# Patient Record
Sex: FEMALE | Race: BLACK OR AFRICAN AMERICAN | URBAN - METROPOLITAN AREA
[De-identification: names, ages, dates, MRNs, and addresses within clinical notes are randomized per-mention and may not be internally consistent; named-entity substitution may affect disease eponyms.]

---

## 2020-09-23 ENCOUNTER — OFFICE VISIT (OUTPATIENT)
Dept: URBAN - METROPOLITAN AREA CLINIC 82 | Facility: CLINIC | Age: 75
End: 2020-09-23
Payer: COMMERCIAL

## 2020-09-23 DIAGNOSIS — K59.00 CONSTIPATION: ICD-10-CM

## 2020-09-23 DIAGNOSIS — R15.9 FECAL INCONTINENCE: ICD-10-CM

## 2020-09-23 DIAGNOSIS — R10.13 DYSPEPSIA: ICD-10-CM

## 2020-09-23 PROCEDURE — G8427 DOCREV CUR MEDS BY ELIG CLIN: HCPCS | Performed by: INTERNAL MEDICINE

## 2020-09-23 PROCEDURE — 3017F COLORECTAL CA SCREEN DOC REV: CPT | Performed by: INTERNAL MEDICINE

## 2020-09-23 PROCEDURE — 99214 OFFICE O/P EST MOD 30 MIN: CPT | Performed by: INTERNAL MEDICINE

## 2020-09-23 PROCEDURE — 1036F TOBACCO NON-USER: CPT | Performed by: INTERNAL MEDICINE

## 2020-09-23 PROCEDURE — G8420 CALC BMI NORM PARAMETERS: HCPCS | Performed by: INTERNAL MEDICINE

## 2020-09-23 RX ORDER — PLECANATIDE 3 MG/1
1 TABLET TABLET ORAL ONCE A DAY
Qty: 90 | Refills: 1 | OUTPATIENT
Start: 2020-09-23 | End: 2021-03-22

## 2020-09-23 RX ORDER — VERAPAMIL HYDROCHLORIDE 240 MG/1
CAPSULE, DELAYED RELEASE PELLETS ORAL
Qty: 0 | Refills: 0 | Status: ACTIVE | COMMUNITY
Start: 1900-01-01

## 2020-09-23 RX ORDER — LEVOTHYROXINE SODIUM 25 UG/1
TABLET ORAL
Qty: 0 | Refills: 0 | Status: ACTIVE | COMMUNITY
Start: 1900-01-01

## 2020-09-23 RX ORDER — DICYCLOMINE HYDROCHLORIDE 10 MG/1
1 TABLET CAPSULE ORAL THREE TIMES A DAY
Qty: 90 | Refills: 1 | OUTPATIENT
Start: 2020-09-23 | End: 2020-11-21

## 2020-09-23 RX ORDER — SOLIFENACIN SUCCINATE 5 MG/1
TABLET, FILM COATED ORAL
Qty: 0 | Refills: 0 | Status: ACTIVE | COMMUNITY
Start: 1900-01-01

## 2020-09-23 RX ORDER — EFINACONAZOLE 100 MG/ML
SOLUTION TOPICAL
Qty: 0 | Refills: 0 | Status: ACTIVE | COMMUNITY
Start: 1900-01-01

## 2020-09-23 RX ORDER — PANTOPRAZOLE SODIUM 40 MG/1
1 TABLET TABLET, DELAYED RELEASE ORAL ONCE A DAY
Qty: 90 | Refills: 1 | OUTPATIENT
Start: 2020-09-23

## 2020-09-23 NOTE — HPI-OTHER HISTORIES
Patient's previous office visit notes: The patient is a 74 year old /Black female, who presents on referral from Ferny Villalobos MD, for a colonoscopy. A copy of this document will be sent to the referring provider. The patient had a previous colonoscopy on 3/10/2016. It revealed diverticulosis and anal fissure in the sigmoid colon. The patient does not have any risk factors for colon cancer, but is over the recommended age for screening.  There is no recent history of rectal bleeding. The patient has no pertinent additional complaints of abdominal pain, constipation, diarrhea, and weight loss.     9/18/2019 Patient denies any symptoms. She states she still has fecal incontinence after she eats. She does notice fecal incontinence more when she consumes milk/dairy products. She occasionally takes Metamucil. She states she has not had any hemorrhoidal surgeries before. She does admit to a history of anemia. She states she has occasional constipation when she doesn't eat fruits and vegetables. She had her last labs with her PCP. She was found to have high glucose. Denies any NSAIDs use. Denies any melena or hematochezia.  12/27/2019 Colonoscopy done on 10/14/2019 - showed hemorrhoids, otherwise normal colonoscopy. preparation was adequate, stool noted in the sigmoid colon, splenic flexure and hepatic flexure. Patient denies any issues since her last visit. Patient still reports occasional constipation, improved. She states she eats vegetables as a source of fiber and also takes Citrucel. Denies any heartburn or acid reflux. Denies any melena or hematochezia.

## 2020-09-23 NOTE — HPI-TODAY'S VISIT:
09/23/2020 Patient presents for a follow up office visit after previous visit on 12/27/2019. Colonoscopy done on 10/11/2019 showed internal hemorrhoids, stool in the colon and poor prep. Patient complains of recent episode of fecal incontinence in August. She had abdominal pain at the time. She was also previously constipated and was on Miralax. She states she has fecal incontinence daily. She was negative for COVID-19. She reports consuming fiber rich foods. She has occasional heartburn and acid reflux, but is not on any medications. She states that in 2006, her doctor noticed she had thyroid problem. She is currently on Levothyroxine.

## 2020-10-19 ENCOUNTER — TELEPHONE ENCOUNTER (OUTPATIENT)
Dept: URBAN - METROPOLITAN AREA CLINIC 82 | Facility: CLINIC | Age: 75
End: 2020-10-19

## 2020-10-19 RX ORDER — DICYCLOMINE HYDROCHLORIDE 10 MG/1
1 TABLET CAPSULE ORAL THREE TIMES A DAY
Qty: 90 | Refills: 1
Start: 2020-09-23

## 2020-10-28 ENCOUNTER — OFFICE VISIT (OUTPATIENT)
Dept: URBAN - METROPOLITAN AREA CLINIC 82 | Facility: CLINIC | Age: 75
End: 2020-10-28

## 2020-11-24 ENCOUNTER — ERX REFILL RESPONSE (OUTPATIENT)
Dept: URBAN - METROPOLITAN AREA CLINIC 82 | Facility: CLINIC | Age: 75
End: 2020-11-24

## 2020-11-24 RX ORDER — DICYCLOMINE HYDROCHLORIDE 10 MG/1
1 TABLET CAPSULE ORAL THREE TIMES A DAY
Qty: 90 | Refills: 1 | OUTPATIENT

## 2020-11-24 RX ORDER — DICYCLOMINE HYDROCHLORIDE 10 MG/1
TAKE 1 CAPSULE BY MOUTH THREE TIMES A DAY CAPSULE ORAL
Qty: 270 CAPSULE | Refills: 1 | OUTPATIENT

## 2020-11-25 ENCOUNTER — OFFICE VISIT (OUTPATIENT)
Dept: URBAN - METROPOLITAN AREA CLINIC 82 | Facility: CLINIC | Age: 75
End: 2020-11-25
Payer: COMMERCIAL

## 2020-11-25 DIAGNOSIS — R15.9 FECAL INCONTINENCE: ICD-10-CM

## 2020-11-25 DIAGNOSIS — R10.13 DYSPEPSIA: ICD-10-CM

## 2020-11-25 DIAGNOSIS — K59.00 CONSTIPATION: ICD-10-CM

## 2020-11-25 PROCEDURE — G8482 FLU IMMUNIZE ORDER/ADMIN: HCPCS | Performed by: INTERNAL MEDICINE

## 2020-11-25 PROCEDURE — 3017F COLORECTAL CA SCREEN DOC REV: CPT | Performed by: INTERNAL MEDICINE

## 2020-11-25 PROCEDURE — G9903 PT SCRN TBCO ID AS NON USER: HCPCS | Performed by: INTERNAL MEDICINE

## 2020-11-25 PROCEDURE — 99214 OFFICE O/P EST MOD 30 MIN: CPT | Performed by: INTERNAL MEDICINE

## 2020-11-25 PROCEDURE — G8420 CALC BMI NORM PARAMETERS: HCPCS | Performed by: INTERNAL MEDICINE

## 2020-11-25 PROCEDURE — G8427 DOCREV CUR MEDS BY ELIG CLIN: HCPCS | Performed by: INTERNAL MEDICINE

## 2020-11-25 RX ORDER — EFINACONAZOLE 100 MG/ML
SOLUTION TOPICAL
Qty: 0 | Refills: 0 | Status: ACTIVE | COMMUNITY
Start: 1900-01-01

## 2020-11-25 RX ORDER — MOXIFLOXACIN HYDROCHLORIDE 400 MG/1
1 TABLET TABLET, FILM COATED ORAL ONCE A DAY
Qty: 10 TABLET | OUTPATIENT
Start: 2020-11-25 | End: 2020-12-05

## 2020-11-25 RX ORDER — VERAPAMIL HYDROCHLORIDE 240 MG/1
CAPSULE, DELAYED RELEASE PELLETS ORAL
Qty: 0 | Refills: 0 | Status: ACTIVE | COMMUNITY
Start: 1900-01-01

## 2020-11-25 RX ORDER — PANTOPRAZOLE SODIUM 40 MG/1
1 TABLET TABLET, DELAYED RELEASE ORAL ONCE A DAY
Qty: 90 | Refills: 1 | Status: ACTIVE | COMMUNITY
Start: 2020-09-23

## 2020-11-25 RX ORDER — DICYCLOMINE HYDROCHLORIDE 10 MG/1
TAKE 1 CAPSULE BY MOUTH THREE TIMES A DAY CAPSULE ORAL
Qty: 270 CAPSULE | Refills: 1 | Status: ACTIVE | COMMUNITY

## 2020-11-25 RX ORDER — LEVOTHYROXINE SODIUM 25 UG/1
TABLET ORAL
Qty: 0 | Refills: 0 | Status: ACTIVE | COMMUNITY
Start: 1900-01-01

## 2020-11-25 RX ORDER — SODIUM, POTASSIUM,MAG SULFATES 17.5-3.13G
354 ML SOLUTION, RECONSTITUTED, ORAL ORAL
Qty: 354 ML | Refills: 0 | OUTPATIENT
Start: 2020-11-25 | End: 2020-11-26

## 2020-11-25 RX ORDER — PLECANATIDE 3 MG/1
1 TABLET TABLET ORAL ONCE A DAY
Qty: 90 | Refills: 1 | Status: ACTIVE | COMMUNITY
Start: 2020-09-23 | End: 2021-03-22

## 2020-11-25 RX ORDER — SOLIFENACIN SUCCINATE 5 MG/1
TABLET, FILM COATED ORAL
Qty: 0 | Refills: 0 | Status: ACTIVE | COMMUNITY
Start: 1900-01-01

## 2020-11-25 NOTE — HPI-TODAY'S VISIT:
09/23/2020 Patient presents for a follow up office visit after previous visit on 12/27/2019. Colonoscopy done on 10/11/2019 showed internal hemorrhoids, stool in the colon and poor prep. Patient complains of recent episode of fecal incontinence in August. She had abdominal pain at the time. She was also previously constipated and was on Miralax. She states she has fecal incontinence daily. She was negative for COVID-19. She reports consuming fiber rich foods. She has occasional heartburn and acid reflux, but is not on any medications. She states that in 2006, her doctor noticed she had thyroid problem. She is currently on Levothyroxine.  11/25/2020  Patient presents for a follow up office visit. Patient states she is not doing well. She states Trulance has caused her to have diarrhea only. She is unable to tolerate the medication and has more fecal incontinence. She is unaware of the fecal incontinence when it does happen. She has abdominal pain only when she really needs to have a bowel movement. However, when she does get to the bathroom there is stool soilage on her underwear. Patient denies any stress more than the usual. She does not sleep well. 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 She did change her diet and tries not to eat too much rice and pasta. She discontinued milk and dairy.

## 2020-12-28 ENCOUNTER — OFFICE VISIT (OUTPATIENT)
Dept: URBAN - METROPOLITAN AREA SURGERY CENTER 13 | Facility: SURGERY CENTER | Age: 75
End: 2020-12-28
Payer: COMMERCIAL

## 2020-12-28 DIAGNOSIS — R93.3 ABN FINDINGS-GI TRACT: ICD-10-CM

## 2020-12-28 DIAGNOSIS — R19.4 ALTERED BOWEL HABITS: ICD-10-CM

## 2020-12-28 PROCEDURE — G8907 PT DOC NO EVENTS ON DISCHARG: HCPCS | Performed by: INTERNAL MEDICINE

## 2020-12-28 PROCEDURE — 45378 DIAGNOSTIC COLONOSCOPY: CPT | Performed by: INTERNAL MEDICINE

## 2020-12-28 PROCEDURE — G9937 DIG OR SURV COLSCO: HCPCS | Performed by: INTERNAL MEDICINE

## 2021-07-02 ENCOUNTER — TELEPHONE ENCOUNTER (OUTPATIENT)
Dept: URBAN - METROPOLITAN AREA CLINIC 82 | Facility: CLINIC | Age: 76
End: 2021-07-02

## 2021-07-13 ENCOUNTER — OFFICE VISIT (OUTPATIENT)
Dept: URBAN - METROPOLITAN AREA CLINIC 82 | Facility: CLINIC | Age: 76
End: 2021-07-13
Payer: COMMERCIAL

## 2021-07-13 VITALS
WEIGHT: 133.4 LBS | HEIGHT: 62 IN | BODY MASS INDEX: 24.55 KG/M2 | SYSTOLIC BLOOD PRESSURE: 133 MMHG | HEART RATE: 83 BPM | DIASTOLIC BLOOD PRESSURE: 69 MMHG | TEMPERATURE: 97.1 F

## 2021-07-13 DIAGNOSIS — R14.3 GAS AND BLOATING: ICD-10-CM

## 2021-07-13 DIAGNOSIS — R10.13 DYSPEPSIA: ICD-10-CM

## 2021-07-13 DIAGNOSIS — K59.1 FUNCTIONAL DIARRHEA: ICD-10-CM

## 2021-07-13 DIAGNOSIS — R15.9 FECAL INCONTINENCE: ICD-10-CM

## 2021-07-13 DIAGNOSIS — K59.00 CONSTIPATION: ICD-10-CM

## 2021-07-13 PROCEDURE — G8420 CALC BMI NORM PARAMETERS: HCPCS | Performed by: INTERNAL MEDICINE

## 2021-07-13 PROCEDURE — G9903 PT SCRN TBCO ID AS NON USER: HCPCS | Performed by: INTERNAL MEDICINE

## 2021-07-13 PROCEDURE — 99214 OFFICE O/P EST MOD 30 MIN: CPT | Performed by: INTERNAL MEDICINE

## 2021-07-13 PROCEDURE — G8427 DOCREV CUR MEDS BY ELIG CLIN: HCPCS | Performed by: INTERNAL MEDICINE

## 2021-07-13 RX ORDER — CHOLESTYRAMINE 4 G/9G
1 PACKET MIXED WITH WATER OR NON-CARBONATED DRINK POWDER, FOR SUSPENSION ORAL TWICE A DAY
Qty: 60 | Refills: 1 | OUTPATIENT
Start: 2021-07-13

## 2021-07-13 RX ORDER — SOLIFENACIN SUCCINATE 5 MG/1
TABLET, FILM COATED ORAL
Qty: 0 | Refills: 0 | Status: ACTIVE | COMMUNITY
Start: 1900-01-01

## 2021-07-13 RX ORDER — EFINACONAZOLE 100 MG/ML
SOLUTION TOPICAL
Qty: 0 | Refills: 0 | Status: ACTIVE | COMMUNITY
Start: 1900-01-01

## 2021-07-13 RX ORDER — PANTOPRAZOLE SODIUM 40 MG/1
1 TABLET TABLET, DELAYED RELEASE ORAL ONCE A DAY
Qty: 90 | Refills: 1 | Status: ACTIVE | COMMUNITY
Start: 2020-09-23

## 2021-07-13 RX ORDER — DICYCLOMINE HYDROCHLORIDE 10 MG/1
TAKE 1 CAPSULE BY MOUTH THREE TIMES A DAY CAPSULE ORAL
Qty: 270 CAPSULE | Refills: 1 | Status: ACTIVE | COMMUNITY

## 2021-07-13 RX ORDER — VERAPAMIL HYDROCHLORIDE 240 MG/1
CAPSULE, DELAYED RELEASE PELLETS ORAL
Qty: 0 | Refills: 0 | Status: ACTIVE | COMMUNITY
Start: 1900-01-01

## 2021-07-13 RX ORDER — LEVOTHYROXINE SODIUM 25 UG/1
TABLET ORAL
Qty: 0 | Refills: 0 | Status: ACTIVE | COMMUNITY
Start: 1900-01-01

## 2021-07-13 NOTE — HPI-TODAY'S VISIT:
09/23/2020 Patient presents for a follow up office visit after previous visit on 12/27/2019. Colonoscopy done on 10/11/2019 showed internal hemorrhoids, stool in the colon and poor prep. Patient complains of recent episode of fecal incontinence in August. She had abdominal pain at the time. She was also previously constipated and was on Miralax. She states she has fecal incontinence daily. She was negative for COVID-19. She reports consuming fiber rich foods. She has occasional heartburn and acid reflux, but is not on any medications. She states that in 2006, her doctor noticed she had thyroid problem. She is currently on Levothyroxine.  11/25/2020  Patient presents for a follow up office visit. Patient states she is not doing well. She states Trulance has caused her to have diarrhea only. She is unable to tolerate the medication and has more fecal incontinence. She is unaware of the fecal incontinence when it does happen. She has abdominal pain only when she really needs to have a bowel movement. However, when she does get to the bathroom there is stool soilage on her underwear. Patient denies any stress more than the usual. She does not sleep well. 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 She did change her diet and tries not to eat too much rice and pasta. She discontinued milk and dairy.  07/13/2021 Patient presents for an office visit for evaluation of bloating, dyspepsia, and diarrhea. Colonoscopy on 12/28/2020 was normal. She c/o gas and bloating with foul-smelling stools and diarrhea. She is currently taking probiotics. She reports dicyclomine worsened her symptoms. Patient would like to have a H pylori breath test done as she has a history of H pylori infection. She also reports sour smelling belching when she eats. Patient does admit experiencing more stress recently. Denies any abdominal pain with her symptoms. She takes Nexium as needed for acid reflux symptoms.

## 2021-07-22 ENCOUNTER — ERX REFILL RESPONSE (OUTPATIENT)
Dept: URBAN - METROPOLITAN AREA CLINIC 82 | Facility: CLINIC | Age: 76
End: 2021-07-22

## 2021-07-22 RX ORDER — CHOLESTYRAMINE 4 G/9G
USE 1 PACKET MIXED WITH WATER OR NON-CARBONATED DRINK BY MOUTH TWICE A DAY X 30 DAY(S) POWDER, FOR SUSPENSION ORAL
Qty: 180 PACKET | Refills: 1 | OUTPATIENT

## 2021-07-22 RX ORDER — CHOLESTYRAMINE 4 G/9G
1 PACKET MIXED WITH WATER OR NON-CARBONATED DRINK POWDER, FOR SUSPENSION ORAL TWICE A DAY
Qty: 60 | Refills: 1 | OUTPATIENT

## 2021-07-31 LAB — H PYLORI BREATH TEST: NEGATIVE

## 2022-10-14 ENCOUNTER — OFFICE VISIT (OUTPATIENT)
Dept: URBAN - METROPOLITAN AREA CLINIC 82 | Facility: CLINIC | Age: 77
End: 2022-10-14
Payer: COMMERCIAL

## 2022-10-14 ENCOUNTER — LAB OUTSIDE AN ENCOUNTER (OUTPATIENT)
Dept: URBAN - METROPOLITAN AREA CLINIC 82 | Facility: CLINIC | Age: 77
End: 2022-10-14

## 2022-10-14 VITALS
BODY MASS INDEX: 21.35 KG/M2 | DIASTOLIC BLOOD PRESSURE: 67 MMHG | HEIGHT: 62 IN | SYSTOLIC BLOOD PRESSURE: 115 MMHG | HEART RATE: 82 BPM | TEMPERATURE: 97.2 F | WEIGHT: 116 LBS | RESPIRATION RATE: 16 BRPM

## 2022-10-14 DIAGNOSIS — K59.00 CONSTIPATION: ICD-10-CM

## 2022-10-14 DIAGNOSIS — R14.3 GAS AND BLOATING: ICD-10-CM

## 2022-10-14 DIAGNOSIS — R15.9 FECAL INCONTINENCE: ICD-10-CM

## 2022-10-14 DIAGNOSIS — K59.1 FUNCTIONAL DIARRHEA: ICD-10-CM

## 2022-10-14 DIAGNOSIS — R93.89 ABNORMAL CT SCAN: ICD-10-CM

## 2022-10-14 DIAGNOSIS — R10.13 DYSPEPSIA: ICD-10-CM

## 2022-10-14 PROCEDURE — 99214 OFFICE O/P EST MOD 30 MIN: CPT | Performed by: INTERNAL MEDICINE

## 2022-10-14 PROCEDURE — G8427 DOCREV CUR MEDS BY ELIG CLIN: HCPCS | Performed by: INTERNAL MEDICINE

## 2022-10-14 PROCEDURE — G9903 PT SCRN TBCO ID AS NON USER: HCPCS | Performed by: INTERNAL MEDICINE

## 2022-10-14 PROCEDURE — G8418 CALC BMI BLW LOW PARAM F/U: HCPCS | Performed by: INTERNAL MEDICINE

## 2022-10-14 RX ORDER — SOLIFENACIN SUCCINATE 5 MG/1
TABLET, FILM COATED ORAL
Qty: 0 | Refills: 0 | Status: ACTIVE | COMMUNITY
Start: 1900-01-01

## 2022-10-14 RX ORDER — LINACLOTIDE 72 UG/1
1 CAPSULE AT LEAST 30 MINUTES BEFORE THE FIRST MEAL OF THE DAY ON AN EMPTY STOMACH CAPSULE, GELATIN COATED ORAL ONCE A DAY
Qty: 30 | Refills: 1 | OUTPATIENT
Start: 2022-10-14 | End: 2022-12-12

## 2022-10-14 RX ORDER — DICYCLOMINE HYDROCHLORIDE 10 MG/1
TAKE 1 CAPSULE BY MOUTH THREE TIMES A DAY CAPSULE ORAL
Qty: 270 CAPSULE | Refills: 1 | Status: ACTIVE | COMMUNITY

## 2022-10-14 RX ORDER — VERAPAMIL HYDROCHLORIDE 240 MG/1
CAPSULE, DELAYED RELEASE PELLETS ORAL
Qty: 0 | Refills: 0 | Status: ACTIVE | COMMUNITY
Start: 1900-01-01

## 2022-10-14 RX ORDER — PANTOPRAZOLE SODIUM 40 MG/1
1 TABLET TABLET, DELAYED RELEASE ORAL ONCE A DAY
Qty: 90 | Refills: 1 | Status: ACTIVE | COMMUNITY
Start: 2020-09-23

## 2022-10-14 RX ORDER — LEVOTHYROXINE SODIUM 25 UG/1
TABLET ORAL
Qty: 0 | Refills: 0 | Status: ACTIVE | COMMUNITY
Start: 1900-01-01

## 2022-10-14 RX ORDER — CHOLESTYRAMINE 4 G/9G
USE 1 PACKET MIXED WITH WATER OR NON-CARBONATED DRINK BY MOUTH TWICE A DAY X 30 DAY(S) POWDER, FOR SUSPENSION ORAL
Qty: 180 PACKET | Refills: 1 | Status: ACTIVE | COMMUNITY

## 2022-10-14 RX ORDER — EFINACONAZOLE 100 MG/ML
SOLUTION TOPICAL
Qty: 0 | Refills: 0 | Status: ACTIVE | COMMUNITY
Start: 1900-01-01

## 2022-10-14 NOTE — HPI-TODAY'S VISIT:
09/23/2020 Patient presents for a follow up office visit after previous visit on 12/27/2019. Colonoscopy done on 10/11/2019 showed internal hemorrhoids, stool in the colon and poor prep. Patient complains of recent episode of fecal incontinence in August. She had abdominal pain at the time. She was also previously constipated and was on Miralax. She states she has fecal incontinence daily. She was negative for COVID-19. She reports consuming fiber rich foods. She has occasional heartburn and acid reflux, but is not on any medications. She states that in 2006, her doctor noticed she had thyroid problem. She is currently on Levothyroxine.  11/25/2020  Patient presents for a follow up office visit. Patient states she is not doing well. She states Trulance has caused her to have diarrhea only. She is unable to tolerate the medication and has more fecal incontinence. She is unaware of the fecal incontinence when it does happen. She has abdominal pain only when she really needs to have a bowel movement. However, when she does get to the bathroom there is stool soilage on her underwear. Patient denies any stress more than the usual. She does not sleep well. 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 She did change her diet and tries not to eat too much rice and pasta. She discontinued milk and dairy.  07/13/2021 Patient presents for an office visit for evaluation of bloating, dyspepsia, and diarrhea. Colonoscopy on 12/28/2020 was normal. She c/o gas and bloating with foul-smelling stools and diarrhea. She is currently taking probiotics. She reports dicyclomine worsened her symptoms. Patient would like to have a H pylori breath test done as she has a history of H pylori infection. She also reports sour smelling belching when she eats. Patient does admit experiencing more stress recently. Denies any abdominal pain with her symptoms. She takes Nexium as needed for acid reflux symptoms.  10/14/2022 Patient presents for a follow up on [facility] ED visit. CT scan done on 10/10/2022 showed thickening of antrum and pylorus. Ultrasound shows thickening of the gall bladder. Patient states that she has been having a lot of flatulence and that she is not moving her bowels. She states that she tried the suppository and colace for constipation but that it has not been helping her. She denies vomiting. 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 She states that the pain has been having pain since September. She states that she also had carpal tunnel surgery and that she was also put on pain medication. She states that she was given ultram. Patient states that miralax did not help her any more as it caused her to have bad diarrhea.

## 2022-10-31 ENCOUNTER — CLAIMS CREATED FROM THE CLAIM WINDOW (OUTPATIENT)
Dept: URBAN - METROPOLITAN AREA SURGERY CENTER 13 | Facility: SURGERY CENTER | Age: 77
End: 2022-10-31

## 2022-10-31 ENCOUNTER — CLAIMS CREATED FROM THE CLAIM WINDOW (OUTPATIENT)
Dept: URBAN - METROPOLITAN AREA CLINIC 4 | Facility: CLINIC | Age: 77
End: 2022-10-31
Payer: COMMERCIAL

## 2022-10-31 ENCOUNTER — CLAIMS CREATED FROM THE CLAIM WINDOW (OUTPATIENT)
Dept: URBAN - METROPOLITAN AREA SURGERY CENTER 13 | Facility: SURGERY CENTER | Age: 77
End: 2022-10-31
Payer: COMMERCIAL

## 2022-10-31 DIAGNOSIS — R10.13 DYSPEPSIA: ICD-10-CM

## 2022-10-31 DIAGNOSIS — K21.00 ALKALINE REFLUX ESOPHAGITIS: ICD-10-CM

## 2022-10-31 DIAGNOSIS — K31.89 ACQUIRED DEFORMITY OF DUODENUM: ICD-10-CM

## 2022-10-31 DIAGNOSIS — R13.19 CERVICAL DYSPHAGIA: ICD-10-CM

## 2022-10-31 DIAGNOSIS — K31.89 OTHER DISEASES OF STOMACH AND DUODENUM: ICD-10-CM

## 2022-10-31 PROCEDURE — 88305 TISSUE EXAM BY PATHOLOGIST: CPT | Performed by: PATHOLOGY

## 2022-10-31 PROCEDURE — G8907 PT DOC NO EVENTS ON DISCHARG: HCPCS | Performed by: INTERNAL MEDICINE

## 2022-10-31 PROCEDURE — 88312 SPECIAL STAINS GROUP 1: CPT | Performed by: PATHOLOGY

## 2022-10-31 PROCEDURE — 43239 EGD BIOPSY SINGLE/MULTIPLE: CPT | Performed by: INTERNAL MEDICINE

## 2022-10-31 RX ORDER — EFINACONAZOLE 100 MG/ML
SOLUTION TOPICAL
Qty: 0 | Refills: 0 | Status: ACTIVE | COMMUNITY
Start: 1900-01-01

## 2022-10-31 RX ORDER — DICYCLOMINE HYDROCHLORIDE 10 MG/1
TAKE 1 CAPSULE BY MOUTH THREE TIMES A DAY CAPSULE ORAL
Qty: 270 CAPSULE | Refills: 1 | Status: ACTIVE | COMMUNITY

## 2022-10-31 RX ORDER — VERAPAMIL HYDROCHLORIDE 240 MG/1
CAPSULE, DELAYED RELEASE PELLETS ORAL
Qty: 0 | Refills: 0 | Status: ACTIVE | COMMUNITY
Start: 1900-01-01

## 2022-10-31 RX ORDER — PANTOPRAZOLE SODIUM 40 MG/1
1 TABLET TABLET, DELAYED RELEASE ORAL ONCE A DAY
Qty: 90 | Refills: 1 | Status: ACTIVE | COMMUNITY
Start: 2020-09-23

## 2022-10-31 RX ORDER — LINACLOTIDE 72 UG/1
1 CAPSULE AT LEAST 30 MINUTES BEFORE THE FIRST MEAL OF THE DAY ON AN EMPTY STOMACH CAPSULE, GELATIN COATED ORAL ONCE A DAY
Qty: 30 | Refills: 1 | Status: ACTIVE | COMMUNITY
Start: 2022-10-14 | End: 2022-12-12

## 2022-10-31 RX ORDER — CHOLESTYRAMINE 4 G/9G
USE 1 PACKET MIXED WITH WATER OR NON-CARBONATED DRINK BY MOUTH TWICE A DAY X 30 DAY(S) POWDER, FOR SUSPENSION ORAL
Qty: 180 PACKET | Refills: 1 | Status: ACTIVE | COMMUNITY

## 2022-10-31 RX ORDER — LEVOTHYROXINE SODIUM 25 UG/1
TABLET ORAL
Qty: 0 | Refills: 0 | Status: ACTIVE | COMMUNITY
Start: 1900-01-01

## 2022-10-31 RX ORDER — METOCLOPRAMIDE HYDROCHLORIDE 5 MG/1
1 TABLET BEFORE MEALS TABLET ORAL THREE TIMES A DAY
Qty: 45 TABLET | Refills: 0 | OUTPATIENT
Start: 2022-10-31

## 2022-10-31 RX ORDER — SOLIFENACIN SUCCINATE 5 MG/1
TABLET, FILM COATED ORAL
Qty: 0 | Refills: 0 | Status: ACTIVE | COMMUNITY
Start: 1900-01-01

## 2022-11-10 ENCOUNTER — TELEPHONE ENCOUNTER (OUTPATIENT)
Dept: URBAN - METROPOLITAN AREA CLINIC 82 | Facility: CLINIC | Age: 77
End: 2022-11-10

## 2022-12-07 ENCOUNTER — OFFICE VISIT (OUTPATIENT)
Dept: URBAN - METROPOLITAN AREA CLINIC 82 | Facility: CLINIC | Age: 77
End: 2022-12-07
Payer: COMMERCIAL

## 2022-12-07 VITALS
HEART RATE: 87 BPM | DIASTOLIC BLOOD PRESSURE: 64 MMHG | SYSTOLIC BLOOD PRESSURE: 129 MMHG | TEMPERATURE: 97.5 F | WEIGHT: 114 LBS | BODY MASS INDEX: 20.98 KG/M2 | HEIGHT: 62 IN | RESPIRATION RATE: 16 BRPM

## 2022-12-07 DIAGNOSIS — R93.89 ABNORMAL CT SCAN: ICD-10-CM

## 2022-12-07 DIAGNOSIS — K59.1 FUNCTIONAL DIARRHEA: ICD-10-CM

## 2022-12-07 DIAGNOSIS — R15.9 FECAL INCONTINENCE: ICD-10-CM

## 2022-12-07 DIAGNOSIS — R14.3 GAS AND BLOATING: ICD-10-CM

## 2022-12-07 DIAGNOSIS — K59.00 CONSTIPATION: ICD-10-CM

## 2022-12-07 DIAGNOSIS — R10.13 DYSPEPSIA: ICD-10-CM

## 2022-12-07 PROCEDURE — G8418 CALC BMI BLW LOW PARAM F/U: HCPCS | Performed by: INTERNAL MEDICINE

## 2022-12-07 PROCEDURE — G8427 DOCREV CUR MEDS BY ELIG CLIN: HCPCS | Performed by: INTERNAL MEDICINE

## 2022-12-07 PROCEDURE — 99214 OFFICE O/P EST MOD 30 MIN: CPT | Performed by: INTERNAL MEDICINE

## 2022-12-07 PROCEDURE — G9903 PT SCRN TBCO ID AS NON USER: HCPCS | Performed by: INTERNAL MEDICINE

## 2022-12-07 RX ORDER — METOCLOPRAMIDE HYDROCHLORIDE 5 MG/1
1 TABLET BEFORE MEALS TABLET ORAL THREE TIMES A DAY
Qty: 45 TABLET | Refills: 0 | Status: ACTIVE | COMMUNITY
Start: 2022-10-31

## 2022-12-07 RX ORDER — METOCLOPRAMIDE HYDROCHLORIDE 10 MG/1
1 TABLET BEFORE MEALS TABLET ORAL THREE TIMES A DAY
Qty: 45 TABLET | Refills: 1 | OUTPATIENT
Start: 2022-12-07

## 2022-12-07 RX ORDER — LINACLOTIDE 72 UG/1
1 CAPSULE AT LEAST 30 MINUTES BEFORE THE FIRST MEAL OF THE DAY ON AN EMPTY STOMACH CAPSULE, GELATIN COATED ORAL ONCE A DAY
Qty: 30 | Refills: 1 | Status: ACTIVE | COMMUNITY
Start: 2022-10-14 | End: 2022-12-12

## 2022-12-07 RX ORDER — SOLIFENACIN SUCCINATE 5 MG/1
TABLET, FILM COATED ORAL
Qty: 0 | Refills: 0 | Status: ACTIVE | COMMUNITY
Start: 1900-01-01

## 2022-12-07 RX ORDER — LEVOTHYROXINE SODIUM 25 UG/1
TABLET ORAL
Qty: 0 | Refills: 0 | Status: ACTIVE | COMMUNITY
Start: 1900-01-01

## 2022-12-07 RX ORDER — PANTOPRAZOLE SODIUM 40 MG/1
1 TABLET TABLET, DELAYED RELEASE ORAL ONCE A DAY
Qty: 90 | Refills: 1 | Status: ACTIVE | COMMUNITY
Start: 2020-09-23

## 2022-12-07 RX ORDER — EFINACONAZOLE 100 MG/ML
SOLUTION TOPICAL
Qty: 0 | Refills: 0 | Status: ACTIVE | COMMUNITY
Start: 1900-01-01

## 2022-12-07 RX ORDER — PANTOPRAZOLE SODIUM 40 MG/1
1 TABLET TABLET, DELAYED RELEASE ORAL ONCE A DAY
Qty: 90 | Refills: 1 | OUTPATIENT
Start: 2022-12-07

## 2022-12-07 RX ORDER — LINACLOTIDE 72 UG/1
1 CAPSULE AT LEAST 30 MINUTES BEFORE THE FIRST MEAL OF THE DAY ON AN EMPTY STOMACH CAPSULE, GELATIN COATED ORAL ONCE A DAY
Qty: 90 | Refills: 0 | OUTPATIENT
Start: 2022-12-07 | End: 2023-03-07

## 2022-12-07 RX ORDER — VERAPAMIL HYDROCHLORIDE 240 MG/1
CAPSULE, DELAYED RELEASE PELLETS ORAL
Qty: 0 | Refills: 0 | Status: ACTIVE | COMMUNITY
Start: 1900-01-01

## 2022-12-07 RX ORDER — CHOLESTYRAMINE 4 G/9G
USE 1 PACKET MIXED WITH WATER OR NON-CARBONATED DRINK BY MOUTH TWICE A DAY X 30 DAY(S) POWDER, FOR SUSPENSION ORAL
Qty: 180 PACKET | Refills: 1 | Status: ACTIVE | COMMUNITY

## 2022-12-07 RX ORDER — DICYCLOMINE HYDROCHLORIDE 10 MG/1
TAKE 1 CAPSULE BY MOUTH THREE TIMES A DAY CAPSULE ORAL
Qty: 270 CAPSULE | Refills: 1 | Status: ACTIVE | COMMUNITY

## 2022-12-07 NOTE — HPI-TODAY'S VISIT:
09/23/2020 Patient presents for a follow up office visit after previous visit on 12/27/2019. Colonoscopy done on 10/11/2019 showed internal hemorrhoids, stool in the colon and poor prep. Patient complains of recent episode of fecal incontinence in August. She had abdominal pain at the time. She was also previously constipated and was on Miralax. She states she has fecal incontinence daily. She was negative for COVID-19. She reports consuming fiber rich foods. She has occasional heartburn and acid reflux, but is not on any medications. She states that in 2006, her doctor noticed she had thyroid problem. She is currently on Levothyroxine.  11/25/2020  Patient presents for a follow up office visit. Patient states she is not doing well. She states Trulance has caused her to have diarrhea only. She is unable to tolerate the medication and has more fecal incontinence. She is unaware of the fecal incontinence when it does happen. She has abdominal pain only when she really needs to have a bowel movement. However, when she does get to the bathroom there is stool soilage on her underwear. Patient denies any stress more than the usual. She does not sleep well. 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 She did change her diet and tries not to eat too much rice and pasta. She discontinued milk and dairy.  07/13/2021 Patient presents for an office visit for evaluation of bloating, dyspepsia, and diarrhea. Colonoscopy on 12/28/2020 was normal. She c/o gas and bloating with foul-smelling stools and diarrhea. She is currently taking probiotics. She reports dicyclomine worsened her symptoms. Patient would like to have a H pylori breath test done as she has a history of H pylori infection. She also reports sour smelling belching when she eats. Patient does admit experiencing more stress recently. Denies any abdominal pain with her symptoms. She takes Nexium as needed for acid reflux symptoms.  10/14/2022 Patient presents for a follow up on [facility] ED visit. CT scan done on 10/10/2022 showed thickening of antrum and pylorus. Ultrasound shows thickening of the gall bladder. Patient states that she has been having a lot of flatulence and that she is not moving her bowels. She states that she tried the suppository and colace for constipation but that it has not been helping her. She denies vomiting. 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 She states that the pain has been having pain since September. She states that she also had carpal tunnel surgery and that she was also put on pain medication. She states that she was given ultram. Patient states that miralax did not help her any more as it caused her to have bad diarrhea.  12/7/2022 Patient presents for a follow up on EGD. Patient states that she has been feeling the same since last time. She denies starting reglan as she has not received it. She denies constipation as she takes linzess and metamucil. She states that she has started taking linzess every other day as it was causing her loose bowels when she was taking it every day. She denies heartburn and acid reflux. Patient states that she was diagnosed with severe nerve damage and that she has some nerve issues.

## 2022-12-22 NOTE — PHYSICAL EXAM MUSCULOSKELETAL:
normal gait and station , no tenderness or deformities present
impaired balance/cognition/decreased strength

## 2022-12-23 ENCOUNTER — TELEPHONE ENCOUNTER (OUTPATIENT)
Dept: URBAN - METROPOLITAN AREA CLINIC 82 | Facility: CLINIC | Age: 77
End: 2022-12-23

## 2023-03-15 ENCOUNTER — OFFICE VISIT (OUTPATIENT)
Dept: URBAN - METROPOLITAN AREA CLINIC 82 | Facility: CLINIC | Age: 78
End: 2023-03-15

## 2023-06-23 ENCOUNTER — OFFICE VISIT (OUTPATIENT)
Dept: URBAN - METROPOLITAN AREA CLINIC 82 | Facility: CLINIC | Age: 78
End: 2023-06-23
Payer: COMMERCIAL

## 2023-06-23 VITALS
TEMPERATURE: 97.8 F | HEART RATE: 64 BPM | DIASTOLIC BLOOD PRESSURE: 71 MMHG | HEIGHT: 62 IN | BODY MASS INDEX: 22.12 KG/M2 | SYSTOLIC BLOOD PRESSURE: 160 MMHG | WEIGHT: 120.2 LBS

## 2023-06-23 DIAGNOSIS — R15.9 FECAL INCONTINENCE: ICD-10-CM

## 2023-06-23 DIAGNOSIS — R14.3 GAS AND BLOATING: ICD-10-CM

## 2023-06-23 DIAGNOSIS — K59.1 FUNCTIONAL DIARRHEA: ICD-10-CM

## 2023-06-23 DIAGNOSIS — R10.13 DYSPEPSIA: ICD-10-CM

## 2023-06-23 DIAGNOSIS — K59.00 CONSTIPATION: ICD-10-CM

## 2023-06-23 DIAGNOSIS — R93.89 ABNORMAL CT SCAN: ICD-10-CM

## 2023-06-23 PROBLEM — 162031009: Status: ACTIVE | Noted: 2020-11-25

## 2023-06-23 PROBLEM — 271832001 FLATULENCE, ERUCTATION AND GAS PAIN: Status: ACTIVE | Noted: 2021-07-13

## 2023-06-23 PROBLEM — 14760008 CONSTIPATION: Status: ACTIVE | Noted: 2020-11-25

## 2023-06-23 PROBLEM — 129679001: Status: ACTIVE | Noted: 2022-10-14

## 2023-06-23 PROCEDURE — G9903 PT SCRN TBCO ID AS NON USER: HCPCS | Performed by: INTERNAL MEDICINE

## 2023-06-23 PROCEDURE — G8427 DOCREV CUR MEDS BY ELIG CLIN: HCPCS | Performed by: INTERNAL MEDICINE

## 2023-06-23 PROCEDURE — 99214 OFFICE O/P EST MOD 30 MIN: CPT | Performed by: INTERNAL MEDICINE

## 2023-06-23 PROCEDURE — G8418 CALC BMI BLW LOW PARAM F/U: HCPCS | Performed by: INTERNAL MEDICINE

## 2023-06-23 PROCEDURE — 1036F TOBACCO NON-USER: CPT | Performed by: INTERNAL MEDICINE

## 2023-06-23 RX ORDER — VERAPAMIL HYDROCHLORIDE 240 MG/1
CAPSULE, DELAYED RELEASE PELLETS ORAL
Qty: 0 | Refills: 0 | Status: ACTIVE | COMMUNITY
Start: 1900-01-01

## 2023-06-23 RX ORDER — METOCLOPRAMIDE HYDROCHLORIDE 5 MG/1
1 TABLET BEFORE MEALS TABLET ORAL THREE TIMES A DAY
Qty: 45 TABLET | Refills: 0 | Status: ACTIVE | COMMUNITY
Start: 2022-10-31

## 2023-06-23 RX ORDER — DICYCLOMINE HYDROCHLORIDE 10 MG/1
TAKE 1 CAPSULE BY MOUTH THREE TIMES A DAY CAPSULE ORAL
Qty: 270 CAPSULE | Refills: 1 | Status: ACTIVE | COMMUNITY

## 2023-06-23 RX ORDER — EFINACONAZOLE 100 MG/ML
SOLUTION TOPICAL
Qty: 0 | Refills: 0 | Status: ACTIVE | COMMUNITY
Start: 1900-01-01

## 2023-06-23 RX ORDER — METOCLOPRAMIDE HYDROCHLORIDE 10 MG/1
1 TABLET BEFORE MEALS TABLET ORAL THREE TIMES A DAY
Qty: 45 TABLET | Refills: 1 | Status: ACTIVE | COMMUNITY
Start: 2022-12-07

## 2023-06-23 RX ORDER — CHOLESTYRAMINE 4 G/9G
USE 1 PACKET MIXED WITH WATER OR NON-CARBONATED DRINK BY MOUTH TWICE A DAY X 30 DAY(S) POWDER, FOR SUSPENSION ORAL
Qty: 180 PACKET | Refills: 1 | Status: ACTIVE | COMMUNITY

## 2023-06-23 RX ORDER — LEVOTHYROXINE SODIUM 25 UG/1
TABLET ORAL
Qty: 0 | Refills: 0 | Status: ACTIVE | COMMUNITY
Start: 1900-01-01

## 2023-06-23 RX ORDER — PANTOPRAZOLE SODIUM 40 MG/1
1 TABLET TABLET, DELAYED RELEASE ORAL ONCE A DAY
Qty: 90 | Refills: 1 | Status: ACTIVE | COMMUNITY
Start: 2020-09-23

## 2023-06-23 RX ORDER — PANTOPRAZOLE SODIUM 40 MG/1
1 TABLET TABLET, DELAYED RELEASE ORAL ONCE A DAY
Qty: 90 | Refills: 1 | Status: ACTIVE | COMMUNITY
Start: 2022-12-07

## 2023-06-23 RX ORDER — SOLIFENACIN SUCCINATE 5 MG/1
TABLET, FILM COATED ORAL
Qty: 0 | Refills: 0 | Status: ACTIVE | COMMUNITY
Start: 1900-01-01

## 2023-06-23 NOTE — HPI-TODAY'S VISIT:
09/23/2020 Patient presents for a follow up office visit after previous visit on 12/27/2019. Colonoscopy done on 10/11/2019 showed internal hemorrhoids, stool in the colon and poor prep. Patient complains of recent episode of fecal incontinence in August. She had abdominal pain at the time. She was also previously constipated and was on Miralax. She states she has fecal incontinence daily. She was negative for COVID-19. She reports consuming fiber rich foods. She has occasional heartburn and acid reflux, but is not on any medications. She states that in 2006, her doctor noticed she had thyroid problem. She is currently on Levothyroxine.  11/25/2020  Patient presents for a follow up office visit. Patient states she is not doing well. She states Trulance has caused her to have diarrhea only. She is unable to tolerate the medication and has more fecal incontinence. She is unaware of the fecal incontinence when it does happen. She has abdominal pain only when she really needs to have a bowel movement. However, when she does get to the bathroom there is stool soilage on her underwear. Patient denies any stress more than the usual. She does not sleep well. 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 She did change her diet and tries not to eat too much rice and pasta. She discontinued milk and dairy.  07/13/2021 Patient presents for an office visit for evaluation of bloating, dyspepsia, and diarrhea. Colonoscopy on 12/28/2020 was normal. She c/o gas and bloating with foul-smelling stools and diarrhea. She is currently taking probiotics. She reports dicyclomine worsened her symptoms. Patient would like to have a H pylori breath test done as she has a history of H pylori infection. She also reports sour smelling belching when she eats. Patient does admit experiencing more stress recently. Denies any abdominal pain with her symptoms. She takes Nexium as needed for acid reflux symptoms.  10/14/2022 Patient presents for a follow up on [facility] ED visit. CT scan done on 10/10/2022 showed thickening of antrum and pylorus. Ultrasound shows thickening of the gall bladder. Patient states that she has been having a lot of flatulence and that she is not moving her bowels. She states that she tried the suppository and colace for constipation but that it has not been helping her. She denies vomiting. 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 She states that the pain has been having pain since September. She states that she also had carpal tunnel surgery and that she was also put on pain medication. She states that she was given ultram. Patient states that miralax did not help her any more as it caused her to have bad diarrhea.  12/7/2022 Patient presents for a follow up on EGD. Patient states that she has been feeling the same since last time. She denies starting reglan as she has not received it. She denies constipation as she takes linzess and metamucil. She states that she has started taking linzess every other day as it was causing her loose bowels when she was taking it every day. She denies heartburn and acid reflux. Patient states that she was diagnosed with severe nerve damage and that she has some nerve issues.  6/23/2023 Patient is here today for a follow up. Patient is doing very well. Patient denies constipation. Patient reports only one incident of fecal incontinence. Patient has been using Miralax instead on Linzess. Patient is not taking pantoprazole at this time. Patient denies melena. Patient reports that she has always had a low hemoglobin. Patient states that she has been seen by hematologists.

## 2023-10-12 ENCOUNTER — ERX REFILL RESPONSE (OUTPATIENT)
Dept: URBAN - METROPOLITAN AREA CLINIC 82 | Facility: CLINIC | Age: 78
End: 2023-10-12

## 2023-10-12 RX ORDER — PANTOPRAZOLE SODIUM 40 MG/1
TAKE 1 TABLET BY MOUTH EVERY DAY FOR 90 DAYS TABLET, DELAYED RELEASE ORAL
Qty: 90 TABLET | Refills: 1 | OUTPATIENT

## 2023-10-12 RX ORDER — PANTOPRAZOLE SODIUM 40 MG/1
1 TABLET TABLET, DELAYED RELEASE ORAL ONCE A DAY
Qty: 90 | Refills: 1 | OUTPATIENT

## 2023-12-22 ENCOUNTER — CLAIMS CREATED FROM THE CLAIM WINDOW (OUTPATIENT)
Dept: URBAN - METROPOLITAN AREA CLINIC 82 | Facility: CLINIC | Age: 78
End: 2023-12-22
Payer: COMMERCIAL

## 2023-12-22 ENCOUNTER — DASHBOARD ENCOUNTERS (OUTPATIENT)
Age: 78
End: 2023-12-22

## 2023-12-22 VITALS
HEART RATE: 88 BPM | WEIGHT: 109 LBS | HEIGHT: 62 IN | DIASTOLIC BLOOD PRESSURE: 62 MMHG | TEMPERATURE: 98.2 F | BODY MASS INDEX: 20.06 KG/M2 | SYSTOLIC BLOOD PRESSURE: 113 MMHG

## 2023-12-22 DIAGNOSIS — R14.3 GAS AND BLOATING: ICD-10-CM

## 2023-12-22 DIAGNOSIS — R10.13 DYSPEPSIA: ICD-10-CM

## 2023-12-22 DIAGNOSIS — K59.00 CONSTIPATION: ICD-10-CM

## 2023-12-22 DIAGNOSIS — R15.9 FECAL INCONTINENCE: ICD-10-CM

## 2023-12-22 DIAGNOSIS — K59.09 CHANGE IN BOWEL MOVEMENTS INTERMITTENT CONSTIPATION. URGENCY IN THE MORNING.: ICD-10-CM

## 2023-12-22 DIAGNOSIS — R19.7 ACUTE DIARRHEA: ICD-10-CM

## 2023-12-22 DIAGNOSIS — K59.1 FUNCTIONAL DIARRHEA: ICD-10-CM

## 2023-12-22 PROCEDURE — 99213 OFFICE O/P EST LOW 20 MIN: CPT | Performed by: INTERNAL MEDICINE

## 2023-12-22 RX ORDER — CHOLESTYRAMINE 4 G/9G
USE 1 PACKET MIXED WITH WATER OR NON-CARBONATED DRINK BY MOUTH TWICE A DAY X 30 DAY(S) POWDER, FOR SUSPENSION ORAL
Qty: 180 PACKET | Refills: 1 | Status: ACTIVE | COMMUNITY

## 2023-12-22 RX ORDER — EFINACONAZOLE 100 MG/ML
SOLUTION TOPICAL
Qty: 0 | Refills: 0 | Status: ACTIVE | COMMUNITY
Start: 1900-01-01

## 2023-12-22 RX ORDER — SOLIFENACIN SUCCINATE 5 MG/1
TABLET, FILM COATED ORAL
Qty: 0 | Refills: 0 | Status: ACTIVE | COMMUNITY
Start: 1900-01-01

## 2023-12-22 RX ORDER — METOCLOPRAMIDE HYDROCHLORIDE 5 MG/1
1 TABLET BEFORE MEALS TABLET ORAL THREE TIMES A DAY
Qty: 45 TABLET | Refills: 0 | Status: ACTIVE | COMMUNITY
Start: 2022-10-31

## 2023-12-22 RX ORDER — DICYCLOMINE HYDROCHLORIDE 10 MG/1
TAKE 1 CAPSULE BY MOUTH THREE TIMES A DAY CAPSULE ORAL
Qty: 270 CAPSULE | Refills: 1 | Status: ACTIVE | COMMUNITY

## 2023-12-22 RX ORDER — PANTOPRAZOLE SODIUM 40 MG/1
TAKE 1 TABLET BY MOUTH EVERY DAY FOR 90 DAYS TABLET, DELAYED RELEASE ORAL
Qty: 90 TABLET | Refills: 1 | Status: ACTIVE | COMMUNITY

## 2023-12-22 RX ORDER — VERAPAMIL HYDROCHLORIDE 240 MG/1
CAPSULE, DELAYED RELEASE PELLETS ORAL
Qty: 0 | Refills: 0 | Status: ACTIVE | COMMUNITY
Start: 1900-01-01

## 2023-12-22 RX ORDER — METOCLOPRAMIDE HYDROCHLORIDE 10 MG/1
1 TABLET BEFORE MEALS TABLET ORAL THREE TIMES A DAY
Qty: 45 TABLET | Refills: 1 | Status: ACTIVE | COMMUNITY
Start: 2022-12-07

## 2023-12-22 RX ORDER — LEVOTHYROXINE SODIUM 25 UG/1
TABLET ORAL
Qty: 0 | Refills: 0 | Status: ACTIVE | COMMUNITY
Start: 1900-01-01

## 2023-12-22 NOTE — HPI-TODAY'S VISIT:
09/23/2020 Patient presents for a follow up office visit after previous visit on 12/27/2019. Colonoscopy done on 10/11/2019 showed internal hemorrhoids, stool in the colon and poor prep. Patient complains of recent episode of fecal incontinence in August. She had abdominal pain at the time. She was also previously constipated and was on Miralax. She states she has fecal incontinence daily. She was negative for COVID-19. She reports consuming fiber rich foods. She has occasional heartburn and acid reflux, but is not on any medications. She states that in 2006, her doctor noticed she had thyroid problem. She is currently on Levothyroxine.  11/25/2020  Patient presents for a follow up office visit. Patient states she is not doing well. She states Trulance has caused her to have diarrhea only. She is unable to tolerate the medication and has more fecal incontinence. She is unaware of the fecal incontinence when it does happen. She has abdominal pain only when she really needs to have a bowel movement. However, when she does get to the bathroom there is stool soilage on her underwear. Patient denies any stress more than the usual. She does not sleep well. 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 She did change her diet and tries not to eat too much rice and pasta. She discontinued milk and dairy.  07/13/2021 Patient presents for an office visit for evaluation of bloating, dyspepsia, and diarrhea. Colonoscopy on 12/28/2020 was normal. She c/o gas and bloating with foul-smelling stools and diarrhea. She is currently taking probiotics. She reports dicyclomine worsened her symptoms. Patient would like to have a H pylori breath test done as she has a history of H pylori infection. She also reports sour smelling belching when she eats. Patient does admit experiencing more stress recently. Denies any abdominal pain with her symptoms. She takes Nexium as needed for acid reflux symptoms.  10/14/2022 Patient presents for a follow up on [facility] ED visit. CT scan done on 10/10/2022 showed thickening of antrum and pylorus. Ultrasound shows thickening of the gall bladder. Patient states that she has been having a lot of flatulence and that she is not moving her bowels. She states that she tried the suppository and colace for constipation but that it has not been helping her. She denies vomiting. 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 She states that the pain has been having pain since September. She states that she also had carpal tunnel surgery and that she was also put on pain medication. She states that she was given ultram. Patient states that miralax did not help her any more as it caused her to have bad diarrhea.  12/7/2022 Patient presents for a follow up on EGD. Patient states that she has been feeling the same since last time. She denies starting reglan as she has not received it. She denies constipation as she takes linzess and metamucil. She states that she has started taking linzess every other day as it was causing her loose bowels when she was taking it every day. She denies heartburn and acid reflux. Patient states that she was diagnosed with severe nerve damage and that she has some nerve issues.  6/23/2023 Patient is here today for a follow up. Patient is doing very well. Patient denies constipation. Patient reports only one incident of fecal incontinence. Patient has been using Miralax instead on Linzess. Patient is not taking pantoprazole at this time. Patient denies melena. Patient reports that she has always had a low hemoglobin. Patient states that she has been seen by hematologists. Patient came for follow-up today.  Denies of any active symptoms she is off of the medications from 6 months she denies of any melena hematochezia denies of any acid reflux no constipation no diarrhea.  She tried some cell therapy that made her very sick and she said after that she stopped taking those and she is much improved now no loss of weight no loss of appetite she is retired and does not have any symptoms right now.

## 2023-12-22 NOTE — HPI-OTHER HISTORIES
Patient's previous office visit notes: The patient is a 74 year old /Black female, who presents on referral from Ferny Villalobos MD, for a colonoscopy. A copy of this document will be sent to the referring provider. The patient had a previous colonoscopy on 3/10/2016. It revealed diverticulosis and anal fissure in the sigmoid colon. The patient does not have any risk factors for colon cancer, but is over the recommended age for screening.  There is no recent history of rectal bleeding. The patient has no pertinent additional complaints of abdominal pain, constipation, diarrhea, and weight loss.     9/18/2019 Patient denies any symptoms. She states she still has fecal incontinence after she eats. She does notice fecal incontinence more when she consumes milk/dairy products. She occasionally takes Metamucil. She states she has not had any hemorrhoidal surgeries before. She does admit to a history of anemia. She states she has occasional constipation when she doesn't eat fruits and vegetables. She had her last labs with her PCP. She was found to have high glucose. Denies any NSAIDs use. Denies any melena or hematochezia.  12/27/2019 Colonoscopy done on 10/14/2019 - showed hemorrhoids, otherwise normal colonoscopy. preparation was adequate, stool noted in the sigmoid colon, splenic flexure and hepatic flexure. Patient denies any issues since her last visit. Patient still reports occasional constipation, improved. She states she eats vegetables as a source of fiber and also takes Citrucel. Denies any heartburn or acid reflux. Denies any melena or hematochezia. 12/22/23

## 2024-09-25 ENCOUNTER — OFFICE VISIT (OUTPATIENT)
Dept: URBAN - METROPOLITAN AREA CLINIC 82 | Facility: CLINIC | Age: 79
End: 2024-09-25
Payer: COMMERCIAL

## 2024-09-25 VITALS
SYSTOLIC BLOOD PRESSURE: 131 MMHG | DIASTOLIC BLOOD PRESSURE: 54 MMHG | BODY MASS INDEX: 21.2 KG/M2 | TEMPERATURE: 97.1 F | HEART RATE: 69 BPM | WEIGHT: 115.2 LBS | HEIGHT: 62 IN

## 2024-09-25 DIAGNOSIS — R10.13 DYSPEPSIA: ICD-10-CM

## 2024-09-25 DIAGNOSIS — K59.1 FUNCTIONAL DIARRHEA: ICD-10-CM

## 2024-09-25 DIAGNOSIS — R15.9 FECAL INCONTINENCE: ICD-10-CM

## 2024-09-25 DIAGNOSIS — K59.00 CONSTIPATION: ICD-10-CM

## 2024-09-25 PROCEDURE — 99214 OFFICE O/P EST MOD 30 MIN: CPT | Performed by: INTERNAL MEDICINE

## 2024-09-25 RX ORDER — EFINACONAZOLE 100 MG/ML
SOLUTION TOPICAL
Qty: 0 | Refills: 0 | Status: ACTIVE | COMMUNITY
Start: 1900-01-01

## 2024-09-25 RX ORDER — LEVOTHYROXINE SODIUM 25 UG/1
TABLET ORAL
Qty: 0 | Refills: 0 | Status: ACTIVE | COMMUNITY
Start: 1900-01-01

## 2024-09-25 RX ORDER — SOLIFENACIN SUCCINATE 5 MG/1
TABLET, FILM COATED ORAL
Qty: 0 | Refills: 0 | Status: ACTIVE | COMMUNITY
Start: 1900-01-01

## 2024-09-25 RX ORDER — DICYCLOMINE HYDROCHLORIDE 10 MG/1
TAKE 1 CAPSULE BY MOUTH THREE TIMES A DAY CAPSULE ORAL
Qty: 270 CAPSULE | Refills: 1 | Status: ACTIVE | COMMUNITY

## 2024-09-25 RX ORDER — METOCLOPRAMIDE HYDROCHLORIDE 10 MG/1
1 TABLET BEFORE MEALS TABLET ORAL THREE TIMES A DAY
Qty: 45 TABLET | Refills: 1 | Status: ACTIVE | COMMUNITY
Start: 2022-12-07

## 2024-09-25 RX ORDER — PANTOPRAZOLE SODIUM 40 MG/1
TAKE 1 TABLET BY MOUTH EVERY DAY FOR 90 DAYS TABLET, DELAYED RELEASE ORAL
Qty: 90 TABLET | Refills: 1 | Status: ACTIVE | COMMUNITY

## 2024-09-25 RX ORDER — CHOLESTYRAMINE 4 G/9G
USE 1 PACKET MIXED WITH WATER OR NON-CARBONATED DRINK BY MOUTH TWICE A DAY X 30 DAY(S) POWDER, FOR SUSPENSION ORAL
Qty: 180 PACKET | Refills: 1 | Status: ACTIVE | COMMUNITY

## 2024-09-25 RX ORDER — VERAPAMIL HYDROCHLORIDE 240 MG/1
CAPSULE, DELAYED RELEASE PELLETS ORAL
Qty: 0 | Refills: 0 | Status: ACTIVE | COMMUNITY
Start: 1900-01-01

## 2024-09-25 NOTE — HPI-TODAY'S VISIT:
09/23/2020 Patient presents for a follow up office visit after previous visit on 12/27/2019. Colonoscopy done on 10/11/2019 showed internal hemorrhoids, stool in the colon and poor prep. Patient complains of recent episode of fecal incontinence in August. She had abdominal pain at the time. She was also previously constipated and was on Miralax. She states she has fecal incontinence daily. She was negative for COVID-19. She reports consuming fiber rich foods. She has occasional heartburn and acid reflux, but is not on any medications. She states that in 2006, her doctor noticed she had thyroid problem. She is currently on Levothyroxine.  11/25/2020  Patient presents for a follow up office visit. Patient states she is not doing well. She states Trulance has caused her to have diarrhea only. She is unable to tolerate the medication and has more fecal incontinence. She is unaware of the fecal incontinence when it does happen. She has abdominal pain only when she really needs to have a bowel movement. However, when she does get to the bathroom there is stool soilage on her underwear. Patient denies any stress more than the usual. She does not sleep well. 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 She did change her diet and tries not to eat too much rice and pasta. She discontinued milk and dairy.  07/13/2021 Patient presents for an office visit for evaluation of bloating, dyspepsia, and diarrhea. Colonoscopy on 12/28/2020 was normal. She c/o gas and bloating with foul-smelling stools and diarrhea. She is currently taking probiotics. She reports dicyclomine worsened her symptoms. Patient would like to have a H pylori breath test done as she has a history of H pylori infection. She also reports sour smelling belching when she eats. Patient does admit experiencing more stress recently. Denies any abdominal pain with her symptoms. She takes Nexium as needed for acid reflux symptoms.  10/14/2022 Patient presents for a follow up on [facility] ED visit. CT scan done on 10/10/2022 showed thickening of antrum and pylorus. Ultrasound shows thickening of the gall bladder. Patient states that she has been having a lot of flatulence and that she is not moving her bowels. She states that she tried the suppository and colace for constipation but that it has not been helping her. She denies vomiting. 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 She states that the pain has been having pain since September. She states that she also had carpal tunnel surgery and that she was also put on pain medication. She states that she was given ultram. Patient states that miralax did not help her any more as it caused her to have bad diarrhea.  12/7/2022 Patient presents for a follow up on EGD. Patient states that she has been feeling the same since last time. She denies starting reglan as she has not received it. She denies constipation as she takes linzess and metamucil. She states that she has started taking linzess every other day as it was causing her loose bowels when she was taking it every day. She denies heartburn and acid reflux. Patient states that she was diagnosed with severe nerve damage and that she has some nerve issues.  6/23/2023 Patient is here today for a follow up. Patient is doing very well. Patient denies constipation. Patient reports only one incident of fecal incontinence. Patient has been using Miralax instead on Linzess. Patient is not taking pantoprazole at this time. Patient denies melena. Patient reports that she has always had a low hemoglobin. Patient states that she has been seen by hematologists. Patient came for follow-up today.  Denies of any active symptoms she is off of the medications from 6 months she denies of any melena hematochezia denies of any acid reflux no constipation no diarrhea.  She tried some cell therapy that made her very sick and she said after that she stopped taking those and she is much improved now no loss of weight no loss of appetite she is retired and does not have any symptoms right now.  9/25/2024 Patient presents for follow up. Patient says that she has nerve damage and severe arthritis, and she says thta she has to have a hip replacement. She says that she has been having no constipation or fecal incontinence. She say sthat she has had occasional acid reflux, and denies bloating. She says that she has been exercising and changing her diet. She denies heart, lung, and kidney problems. She says that she was on Trulicity for diabetes and says that she went 3 months without insulin and she is now on Ozempic. She says that she does not like taking the Ozempic, she denies nausea or indigestion while taking it.

## 2025-07-23 ENCOUNTER — OFFICE VISIT (OUTPATIENT)
Dept: URBAN - METROPOLITAN AREA CLINIC 82 | Facility: CLINIC | Age: 80
End: 2025-07-23

## 2025-08-15 ENCOUNTER — OFFICE VISIT (OUTPATIENT)
Dept: URBAN - METROPOLITAN AREA CLINIC 82 | Facility: CLINIC | Age: 80
End: 2025-08-15
Payer: COMMERCIAL

## 2025-08-15 ENCOUNTER — LAB OUTSIDE AN ENCOUNTER (OUTPATIENT)
Dept: URBAN - METROPOLITAN AREA CLINIC 82 | Facility: CLINIC | Age: 80
End: 2025-08-15

## 2025-08-15 DIAGNOSIS — Z12.11 ENCOUNTER FOR SCREENING FOR MALIGNANT NEOPLASM OF COLON: ICD-10-CM

## 2025-08-15 PROCEDURE — 99213 OFFICE O/P EST LOW 20 MIN: CPT

## 2025-08-15 RX ORDER — PANTOPRAZOLE SODIUM 40 MG/1
TAKE 1 TABLET BY MOUTH EVERY DAY FOR 90 DAYS TABLET, DELAYED RELEASE ORAL
Qty: 90 TABLET | Refills: 1 | Status: ACTIVE | COMMUNITY

## 2025-08-15 RX ORDER — POLYETHYLENE GLYCOL-3350 AND ELECTROLYTES 236; 6.74; 5.86; 2.97; 22.74 G/274.31G; G/274.31G; G/274.31G; G/274.31G; G/274.31G
4000ML POWDER, FOR SOLUTION ORAL DAILY
Qty: 4000 ML | Refills: 0 | OUTPATIENT
Start: 2025-08-15 | End: 2025-08-16

## 2025-08-15 RX ORDER — SOLIFENACIN SUCCINATE 5 MG/1
TABLET, FILM COATED ORAL
Qty: 0 | Refills: 0 | Status: ACTIVE | COMMUNITY
Start: 1900-01-01

## 2025-08-15 RX ORDER — CHOLESTYRAMINE 4 G/9G
USE 1 PACKET MIXED WITH WATER OR NON-CARBONATED DRINK BY MOUTH TWICE A DAY X 30 DAY(S) POWDER, FOR SUSPENSION ORAL
Qty: 180 PACKET | Refills: 1 | Status: ACTIVE | COMMUNITY

## 2025-08-15 RX ORDER — VERAPAMIL HYDROCHLORIDE 240 MG/1
CAPSULE, DELAYED RELEASE ORAL
Qty: 0 | Refills: 0 | Status: ACTIVE | COMMUNITY
Start: 1900-01-01

## 2025-08-15 RX ORDER — EFINACONAZOLE 100 MG/ML
SOLUTION TOPICAL
Qty: 0 | Refills: 0 | Status: ACTIVE | COMMUNITY
Start: 1900-01-01

## 2025-08-15 RX ORDER — LEVOTHYROXINE SODIUM 0.03 MG/1
TABLET ORAL
Qty: 0 | Refills: 0 | Status: ACTIVE | COMMUNITY
Start: 1900-01-01

## 2025-08-15 RX ORDER — DICYCLOMINE HYDROCHLORIDE 10 MG/1
TAKE 1 CAPSULE BY MOUTH THREE TIMES A DAY CAPSULE ORAL
Qty: 270 CAPSULE | Refills: 1 | Status: ACTIVE | COMMUNITY

## 2025-08-15 RX ORDER — METOCLOPRAMIDE HYDROCHLORIDE 10 MG/1
1 TABLET BEFORE MEALS TABLET ORAL THREE TIMES A DAY
Qty: 45 TABLET | Refills: 1 | Status: ACTIVE | COMMUNITY
Start: 2022-12-07